# Patient Record
Sex: MALE | Race: WHITE
[De-identification: names, ages, dates, MRNs, and addresses within clinical notes are randomized per-mention and may not be internally consistent; named-entity substitution may affect disease eponyms.]

---

## 2019-07-05 ENCOUNTER — HOSPITAL ENCOUNTER (OUTPATIENT)
Dept: HOSPITAL 84 - D.ER | Age: 54
Setting detail: OBSERVATION
LOS: 1 days | Discharge: HOME | End: 2019-07-06
Attending: INTERNAL MEDICINE | Admitting: INTERNAL MEDICINE
Payer: MEDICARE

## 2019-07-05 VITALS
WEIGHT: 225.47 LBS | HEIGHT: 70 IN | BODY MASS INDEX: 32.28 KG/M2 | WEIGHT: 225.47 LBS | BODY MASS INDEX: 32.28 KG/M2 | HEIGHT: 70 IN

## 2019-07-05 VITALS — SYSTOLIC BLOOD PRESSURE: 129 MMHG | DIASTOLIC BLOOD PRESSURE: 74 MMHG

## 2019-07-05 VITALS — DIASTOLIC BLOOD PRESSURE: 73 MMHG | SYSTOLIC BLOOD PRESSURE: 118 MMHG

## 2019-07-05 VITALS — DIASTOLIC BLOOD PRESSURE: 68 MMHG | SYSTOLIC BLOOD PRESSURE: 128 MMHG

## 2019-07-05 VITALS — DIASTOLIC BLOOD PRESSURE: 73 MMHG | SYSTOLIC BLOOD PRESSURE: 130 MMHG

## 2019-07-05 DIAGNOSIS — I25.110: Primary | ICD-10-CM

## 2019-07-05 DIAGNOSIS — E78.5: ICD-10-CM

## 2019-07-05 DIAGNOSIS — E87.1: ICD-10-CM

## 2019-07-05 DIAGNOSIS — E83.42: ICD-10-CM

## 2019-07-05 DIAGNOSIS — N17.9: ICD-10-CM

## 2019-07-05 DIAGNOSIS — I10: ICD-10-CM

## 2019-07-05 DIAGNOSIS — F17.213: ICD-10-CM

## 2019-07-05 DIAGNOSIS — E11.65: ICD-10-CM

## 2019-07-05 LAB
ALBUMIN SERPL-MCNC: 3.5 G/DL (ref 3.4–5)
ALP SERPL-CCNC: 68 U/L (ref 46–116)
ALT SERPL-CCNC: 39 U/L (ref 10–68)
AMYLASE SERPL-CCNC: 22 U/L (ref 25–115)
ANION GAP SERPL CALC-SCNC: 15.1 MMOL/L (ref 8–16)
APTT BLD: 26.2 SECONDS (ref 22.8–39.4)
BASOPHILS NFR BLD AUTO: 0.2 % (ref 0–2)
BILIRUB SERPL-MCNC: 0.37 MG/DL (ref 0.2–1.3)
BUN SERPL-MCNC: 12 MG/DL (ref 7–18)
CALCIUM SERPL-MCNC: 9.7 MG/DL (ref 8.5–10.1)
CHLORIDE SERPL-SCNC: 99 MMOL/L (ref 98–107)
CK MB SERPL-MCNC: 0.5 U/L (ref 0–3.6)
CK SERPL-CCNC: 85 UL (ref 21–232)
CO2 SERPL-SCNC: 21.6 MMOL/L (ref 21–32)
CREAT SERPL-MCNC: 1.6 MG/DL (ref 0.6–1.3)
EOSINOPHIL NFR BLD: 2.4 % (ref 0–7)
ERYTHROCYTE [DISTWIDTH] IN BLOOD BY AUTOMATED COUNT: 14.1 % (ref 11.5–14.5)
GLOBULIN SER-MCNC: 3.1 G/L
GLUCOSE SERPL-MCNC: 472 MG/DL (ref 74–106)
HCT VFR BLD CALC: 39.5 % (ref 42–54)
HGB BLD-MCNC: 14.2 G/DL (ref 13.5–17.5)
IMM GRANULOCYTES NFR BLD: 0.6 % (ref 0–5)
INR PPP: 0.96 (ref 0.85–1.17)
KETONES SERPL-MCNC: NEGATIVE MG/DL
LIPASE SERPL-CCNC: 115 U/L (ref 73–393)
LYMPHOCYTES NFR BLD AUTO: 28 % (ref 15–50)
MAGNESIUM SERPL-MCNC: 1.6 MG/DL (ref 1.8–2.4)
MCH RBC QN AUTO: 29.4 PG (ref 26–34)
MCHC RBC AUTO-ENTMCNC: 35.9 G/DL (ref 31–37)
MCV RBC: 81.8 FL (ref 80–100)
MONOCYTES NFR BLD: 7.1 % (ref 2–11)
NEUTROPHILS NFR BLD AUTO: 61.7 % (ref 40–80)
OSMOLALITY SERPL CALC.SUM OF ELEC: 285 MOSM/KG (ref 275–300)
PLATELET # BLD: 154 10X3/UL (ref 130–400)
PMV BLD AUTO: 9.8 FL (ref 7.4–10.4)
POTASSIUM SERPL-SCNC: 3.7 MMOL/L (ref 3.5–5.1)
PROT SERPL-MCNC: 6.6 G/DL (ref 6.4–8.2)
PROTHROMBIN TIME: 12.3 SECONDS (ref 11.6–15)
RBC # BLD AUTO: 4.83 10X6/UL (ref 4.2–6.1)
SODIUM SERPL-SCNC: 132 MMOL/L (ref 136–145)
TROPONIN I SERPL-MCNC: < 0.017 NG/ML (ref 0–0.06)
WBC # BLD AUTO: 5.1 10X3/UL (ref 4.8–10.8)

## 2019-07-05 NOTE — HEMODYNAMI
PATIENT:ANGUS ROCHA                                  MEDICAL RECORD: [id number]
: 65                                            [address]
[id number]                                       ADMISSION DATE: 19
 
 
 Generatedon:201910:24
Patient name: ANGUS ROCHA Patient #: [id number] Visit #: [id number] SSN: 
: 1965 Date
of study: 2019
Page: Of
Hemodynamic Procedure Report
****************************
Patient Data
Patient Demographics
Procedure consent was obtained
First Name: ANGUS          Gender: Male
Last Name: AJ           : 1965
Middle Initial: R           Age: 54 year(s)
Patient #: [id number]       Race: Unknown
Visit #: [id number]
Accession #:
[id number]
Additional ID: [id number]
Contact details
Address: [address] [place] [address]   Phone: [phone]
[address]
State: [place]
City: [place]
Zip code: [address]
Past Medical History
Allergies: No known allergies
Admission
Admission Data
Admission Date: 2019    Admission Time: 23:34
Room #: 
Lab Results
Lab Result Date: 2019   Lab Result Time: 0:00
Biochemistry
Name         Units    Result                Min      Max
BUN          mg/dl    13       --(--*-)--   7        18
Creatinine   mg/dl    1.3      --(---*)--   0.6      1.3
eGFR         ml/min   61.08005 *-(----)--   90       120
NONAFRICAN
CBC
Name         Units    Result                Min      Max
Hematocrit   %        39.7     -*(----)--   42       54
Hemoglobin   g/dl     13.9     --(*---)--   13.5     17.5
Procedure
Procedure Types
Cath Procedure
Diagnostic Procedure
LHC
LHC w/Coronaries
Procedure Description
Procedure Date
Procedure Date: 2019
Procedure Start Time: 10:14
Procedure End Time: 10:23
Procedure Staff
 
Name                            Function
Charbel Saravia MD              Performing Physician
Sanju Miller RT                  Monitor
Marlin Rosa RT               Scrub
Ida Flores RN                  Nurse
Procedure Data
Cath Procedure
Fluoroscopy
Diagnostic fluoroscopy      Total fluoroscopy Time: 1.3
time: 1.3 min               min
Diagnostic fluoroscopy      Total fluoroscopy dose: 576
dose: 576 mGy               mGy
Contrast Material
Contrast Material Type                       Amount (ml)
Isovue 370                                   35
Entry Location
Entry     Primary  Successful  Side  Size  Upsize Upsize Entry    Closure     Kim
ccessful  Closure
Location                             (Fr)  1 (Fr) 2 (Fr) Remarks  Device        
          Remarks
Radial                         Right 6 Fr                         Mechanical
artery                               Short                        Compression
Estimated blood loss: 10 ml
Diagnostic catheters
Device Type               Used For           End Catheter
Placement
DIAGNOSTIC Dimock 110cm 5  Procedure
Fr catheter ([id number])
Procedure Complications
No complications
Procedure Medications
Medication           Administration Route Dosage
Oxygen               etCO2 Nasal cannula  2 l/min
Lidocaine 2%         added to field       20
Heparin Flush Bag    added to field       2 bags
(1000units/500ml NS)
0.9% NaCl            I.V.                 100 ml/hr
Radial Cocktail      I.A.                 1 syringe
(Verapamil 2mg/Nitro
400mcg/Heparin
1500units)
Versed               I.V.                 1 mg
Fentanyl             I.V.                 50 mcg
Versed               I.V.                 1 mg
Fentanyl             I.V.                 50 mcg
Hemodynamics
Rest
HGB: 13.9 (g/dl) Heart Rate: 66 (bpm)
Pressure Samples
Time     Site     Value (mmHg) Purpose      Heart      Use
Rate(bpm)
10:17    LV       121/14,15    Snapshot     69
Gradients
Valve  Time  Site Site Mean    SEP/DFP    Peak To Heart  Use
1    2    (mmHg)  (sec/min)  Peak    Rate
(mmHg)  (bpm)
Aortic 10:18 LV   AO                              75
Snapshots
Pre Cath      Intra         NCS           Post Cath
Vital Signs
 
Time     Heart  Resp   SPO2 etCO2   NIBP (mmHg) Rhythm  Pain    Sedation
Rate   (ipm)  (%)  (mmHg)                      Status  Level
(bpm)
10:06:36 65     14     96   27.8    132/95(114) NSR     0 (11)  10(A)
, No
pain
10:10:49 63     17     94   13.5    133/87(107) NSR     0 (11)  10(A)
, No
pain
10:15:59 69     16     94   15      135/85(109) NSR     0 (11)  9(A)
, No
pain
10:20:13 88     13     94   10.5    127/81(107) NSR     0 (11)  9(A)
, No
pain
10:23:31 82     15     93   12.7    124/88(103) NSR     0 (11)  10(A)
, No
pain
Medications
Time     Medication       Route   Dose    Verified Delivered Reason       Notes 
 Effectiveness
by       by
10:05:29 Oxygen           etCO2   2 l/min Charbel Prieto    used for
Nasal           [facility] Flores RN   procedure
cannula         MD
10:07:06 Radial Cocktail  I.A.    1       Charbel Barger   for
(Verapamil               syringe [facility]   vasodilation
2mg/Nitro                        MD MANUEL
400mcg/Heparin
1500units)
10:07:37 Lidocaine 2%     added   20ml    Charbel Barger   for local
to      vial    [facility]   anesthetic
field           MD       MD
10:07:43 Heparin Flush    added   2 bags  Charbel Barger   used for
Bag              to              [facility]   procedure
(1000units/500ml field           MD       MD
NS)
10:07:52 0.9% NaCl        I.V.    100     Charbel Prieto    Per
ml/hr   [facility] Flores RN   physician
MD
10:12:05 Versed           I.V.    1 mg    Charbel Prieto    for sedation
[facility] Flores RN, MD
10:12:13 Fentanyl         I.V.    50 mcg  Charbel Prieto    for sedation
[facility] Flores RN, MD
10:19:27 Versed           I.V.    1 mg    Charbel Prieto    for sedation
[facility] Flores RN, MD
10:19:32 Fentanyl         I.V.    50 mcg  Charbel Prieto    for sedation
[facility] Flores RN, MD
Procedure Log
Time     Note
9:35:14  Signed procedure consent form obtained from patient.
9:35:17  Diagnostic Cath status Urgent
9:35:18  Time tracking: Regular hours (M-F 7:00 - 5:00)
9:35:22  Plan of Care:Hemodynamics will remain stable., Cardiac
rhythm will remain stable., Comfort level will be
maintained., Respiratory function will remain
 
adequate., Patient/ family verbilizes understanding of
procedure., Procedure tolerated without complication.,
Recovers from procedure without complications..
9:35:24  Sanju Miller RT(R) sent for patient. Start room use.
9:39:40  Is patient on blood thinner?Yes
9:39:43  **ACC** The patient was administered the following
blood thiners within the last 24 hours: **ACC**Plavix
9:39:46  Patient diabetic? Yes.
9:39:47  If diabetic: On Metformin? Yes
9:39:49  If on Metformin: Last Dose? 2019
9:40:25  Patient allergic to No known allergies
9:41:34  Lab Result : BUN 13 mg/dl
9:41:34  Lab Result : Creatinine 1.3 mg/dl
9:41:34  Lab Result : eGFR NONAFRICAN 61.64707 ml/min
9:41:34  Lab Result : Hemoglobin 13.9 g/dl
9:41:34  Lab Result : Hematocrit 39.7 %
9:52:24  Patient received from Med II to CCL 1 Alert and
oriented. Tansferred to table in Supine position.
9:52:25  Warm blankets applied, and sandi hugger turned on for
patient comfort.
9:52:26  Correct patient and procedure confirmed by team.
9:52:26  ECG and BP/O2 sat monitors applied to patient.
10:05:29 Oxygen 2 l/min etCO2 Nasal cannula was administered by
Ida Flores RN; used for procedure;
10:05:29 Vital chart was started
10:07:06 Radial Cocktail (Verapamil 2mg/Nitro 400mcg/Heparin
1500units) 1 syringe I.A. was administered by Charbel Saravia MD; for vasodilation;
10:07:37 Lidocaine 2% 20ml vial added to field was administered
by Charbel Saravia MD; for local anesthetic;
10:07:43 Heparin Flush Bag (1000units/500ml NS) 2 bags added to
field was administered by Charbel Saravia MD; used for
procedure;
10:07:52 0.9% NaCl 100 ml/hr I.V. was administered by Ida Flores RN; Per physician;
10:09:27 Baseline sample Acquired.
10:09:29 Rhythm: sinus rhythm
10:09:30 Full Disclosure recording started
10:09:50 H&P Date Dictated: 2019 Within 30 days and on
chart..
10:09:53 Pre-procedure instructions explained to patient.
10:09:53 Pre-op teaching completed and patient verbalized
understanding.
10:09:56 Family unavailable.
10:09:57 Patient NPO since Midnight.
10:09:59 Is the patient allergic to Iodine/contrast media? No.
10:10:05 Previous problem with sedation/anesthesia? No ?
10:10:05 Snore? Yes
10:10:06 Sleep apnea? Yes
10:10:08 Deviated septum? No
10:10:08 Opens mouth fully? Yes
10:10:09 Sticks out tongue? Yes
10:10:11 Airway obstruction? No ?
10:10:14 Dentures? Yes OUT
10:10:18 Pre procedure: right dorsailis pedis pulse 1+
Palpable, but thready & weak; easily obliterated
10:10:19 Modified Luis's test Ulnar < 7 seconds
10:10:22 Patient pain scale 0/10 ?.
10:10:27 IV patent on arrival in left antecubital with 0.9%
NaCl at O.
 
10:10:29 Lab results completed and on chart.
10:10:32 Right Radial & Right Groin area was prepped with
chlora-prep and draped in sterile fashion
10:10:33 Alarms reviewed by R. N.
10:10:33 Sharps counted by scrub and verified by R.N.
10:10:35 --------ALL STOP TIME OUT------
10:10:35 Final Timeout: patient, procedure, and site verified
with staff and physician. All members of the team are
in agreement.
10:10:39 Right Radial & Right Groin site verified by team.
10:10:43 Fire Safety Assessment: A--An alcohol-based skin
anteseptic being used preoperatively., C--Open oxygen
or nitrous oxide is being used., D--An ESU, laser, or
fiber-optic light is being used.
10:10:45 Physical assessment completed. ASA score P 2 - A
patient with mild systemic disease as per Charbel Saravia MD.
10:10:50 2) 60-89 Mildly reduced kidney function, and other
findings (as for stage 1) point to kidney disease.
10:10:54 Maximum allowable contrast does (3.7 X eGFR X 0.75)169
ml.
10:10:59 Sedation plan: IV Moderate Sedation Medication:Versed,
Fentanyl
10:12:05 Versed 1 mg I.V. was administered by Ida Flores RN;
for sedation;
10:12:13 Fentanyl 50 mcg I.V. was administered by Ida Flores
RN; for sedation;
10:13:57 Use device set Radial Dx or PCI
10:13:58 Tegaderm 4 x 4 (1626W) opened to sterile field.
10:13:59 ACIST Hand Control (92368) opened to sterile field.
10:14:00 ACIST Manifold (21802) opened to sterile field.
10:14:00 Bag Decanter () opened to sterile field.
10:14:01 Medline Cath Pack ([id number]) opened to sterile field.
10:14:01 ACIST Syringe (46836) opened to sterile field.
10:14:02 MBrace Wrist Support ([id number]) opened to sterile
field.
10:14:04 SHEATH 6FR Slender () opened to sterile field.
10:14:04 EMERALD Guide Wire (052-582) opened to sterile field.
10:14:09 Procedure started.
10:14:14 Local anesthetic to right radial artery with Lidocaine
2% by Charbel Saravia MD.**INITIAL ACCESS ONLY**
10:14:31 IV Extension Set opened to sterile field.
10:16:45 A 6 Fr Short sheath was inserted into the Right Radial
artery
10:17:15 A DIAGNOSTIC Tiger 110cm 5 Fr catheter ([id number]) was
advanced over the wire and used for Procedure.
10:18:17 LV angiography performed.
10:18:18 LV gram done using RUSHING
10:18:36 EF : 55 %
10:18:39 LV hemodynamics recorded.
10:18:42 Injector settings: Ml/sec: 7, Volume: 15,
10:19:16 LCA angiography performed.
10:19:27 Versed 1 mg I.V. was administered by Ida Flores RN;
for sedation;
10:19:32 Fentanyl 50 mcg I.V. was administered by Ida Flores
RN; for sedation;
10:20:17 RCA angiography performed.
10:20:45 Catheter removed.
10:21:20 Sheath removed intact; hemostasis achieved with
Mechanical Compression to the Right Radial artery.
 
10:21:22 Procedure ended.(Physican Out)
10:21:50 Fluoroscopy time 01.30 minutes.
10:21:54 Fluoroscopy dose: 576 mGy
10:21:54 Flurop Dose total: 576
10:21:59 Contrast amount:Isovue 370 35ml.
10:22:01 Sharps counted by scrub and verified by R.N.
10:22:05 TR band inflated with 10cc of air.
10:22:07 Insertion/operative site no bleeding no hematoma.
10:22:07 Post Procedure Pulses reassessed and unchanged
10:22:11 Post-procedure physical assessment completed. ASA
score P 2 - A patient with mild systemic disease as
per Charbel Saravia MD.
10:22:13 Post procedure rhythm: unchanged.
10:22:16 Estimated blood loss: 10 ml
10:22:18 Post procedure instruction explained to
patient.Patient verbalizes understanding.
10:22:18 Patient needs reinforcement of post procedure
teaching.
10:22:38 TR BAND Large ([id number]) opened to sterile field.
10:22:54 Procedure and supply charges have been captured,
reviewed, submitted and are correct.
10:22:57 Procedure Complication : No complications
10:23:00 Vital chart was stopped
10:23:01 See physician's report for complete and final results.
10:23:03 Report given to [facility].
10:23:11 Patient transfered to [facility] with Bed.
10:23:13 Procedure ended.
10:23:13 Full Disclosure recording stopped
10:23:52 End room use (Document Last)
Device Usage
Item Name   Manufacture  Quantity  Catalog      Hospital Part    Current Minimal
 Lot# /
Number       Charge   Number  Stock   Stock   Serial#
Code
Tegaderm 4  3M           1         1626W        019286   537101  755387  5
x 4 (1626W)
ACIST Hand  Acist        1         32582        535112   467968  943577  5
Control     Medical
(04867)     Systems Inc
ACIST       Acist        1         62833        478633   960003  694665  5
Manifold    Medical
(78495)     Systems Inc
Bag         Microtek     1         S        419959   64161   393498  5
Decanter    Medical Inc.
(S)
Medline     Medline      1         [id number]    606521   60241   957718  5
Cath Pack
([id number])
ACIST       Acist        1         82347        490583   262625  262932  20
Syringe     Medical
(39346)     Systems Inc
MBrace      Advanced     1         140-0250-00  490427   56537   518863  5
Wrist       Vascular
Support     Dynamics
([id number])
SHEATH 6FR  Terumo       1         [id number]   305044   614763  162675  5
Slender
()
EMERALD     Cardinal     1         502-455      309135   597388  518119  5
Guide Wire  Health
 
(502455)
IV          Hospira      1         54857-15     276952   85109   902161  5
Extension
Set
DIAGNOSTIC  Terumo       1               940954   073514  133379  5
Tiger 110cm
5 Fr
catheter
(487512)
TR BAND     Terumo       1         [id number]    706181   922290  825915  40
Large
([id number])
Signature Audit [facility]
Stage           Time        Signature      Unsigned
Intra-Procedure 2019    Sanju Miller
10:24:30 AM RT(R)
Signatures
Monitor : Sanju Miller RT Signature :
______________________________
Date : ______________ Time :
______________
 
 
 
 
 
 
 
 
 
 
 
 
 
 
 
 
 
 
 
 
 
 
 
 
 
 
 
 
 
 
 
 
 
 
[address] [address]

## 2019-07-05 NOTE — EC
PATIENT:ANGUS ROCHA                 DATE OF SERVICE: 07/05/19
SEX: M                                  MEDICAL RECORD: [id number]
DATE OF BIRTH: [birth year]                        LOCATION:D.M2      D.210
AGE OF PATIENT: 54                             ADMISSION DATE: 07/05/19
 
REFERRING PHYSICIAN:                               
 
INTERPRETING PHYSICIAN: ALONDRA HAMILTON MD          
 
 
 
                             ECHOCARDIOGRAM REPORT
  ECHO CHARGES 4               ECHO COMPLETE                 Date: 07/06/19
 
 
 
CLINICAL DIAGNOSIS: CP                            
 
                         ECHOCARDIOGRAPHIC MEASUREMENTS
      (adult normal given)
   AC root (d.<3.7cm) 3.3  cm   LV Septum d (<1.2 cm> 1.0  cm
      Valve Excursion 2.1  cm     LV Septum (systole) 1.6  cm
Left Atria (s.<4.0cm> 3.3  cm          LVPW d(<1.2cm) 1.4  cm
        RV (d.<2.3cm) 2.2  cm           LVPW (sytole) 1.5  cm
  LV diastole(<5.6CM) 5.5  cm       MV E-F(>70mm/sec)      cm
           LV systole 4.2  cm           LVOT Diameter 1.9  cm
       MV exc.(>10mm)      cm
Est.ejection fraction (50-75%)     %
 
   DOPPLER:
     LVIT      cm/sec A 67   cm/sec E 60    cm/sec
       LA      cm/sec      RVSP 26.8 mmHg
     LVOT 100  cm/sec   AOP1/2T      m/s
  Asc. Ao 130  cm/sec
     RVOT 53   cm/sec
       RA      cm/sec
       PA 72   cm/sec
 AV Gradient Peak 6.8  mmHg  AV Mean 3.5  mmHg  AV Area 2.1  cm
 MV Gradient Peak 3.1  mmHg  MV Mean 1.7  mmHg  MV Area      cm
   COMMENTS:                                              
 
 
 Cardiac Sonographer: Shailesh               KATARZYNA DOWNS             
      Cardiologist: 3          Dr. Anglin             
             TAPE# PACS           
                                       Pericardial Effusion N                        
 
 
DATE OF SERVICE:  
 
Adequate 2-D echo, color-flow and spectral Doppler, and M-mode.
 
No LVH.  LV internal dimensions are normal.  Wall motion is normal.  EF is
greater than 55%.  Aortic valve is tricuspid.  No evidence of stenosis by
Doppler interrogation.  Left atrium is normal at 3.3 cm.  Mitral valve shows no
prolapse.  Trace MR.  Right-sided chambers are grossly normal.  Mild TR.
 
TRANSINT:[id number] Voice Confirmation ID: [id number] DOCUMENT ID: [id number]
 
 
 
ECHOCARDIOGRAM REPORT                          [id number]    AJ,ANGUS ALONDRA SMITH MD          
 
 
 
 
 
 
 
 
 
 
 
 
 
 
 
 
 
 
 
 
 
 
 
 
 
 
 
 
 
 
 
 
 
 
 
 
 
 
 
 
 
 
 
 
CC:                                                             [id number]
DICTATION DATE: 07/07/19 1007     :     07/07/19 1315      DIS IN  
                                                                      07/06/19
[place] [id number] [place] [id number]

## 2019-07-06 VITALS — SYSTOLIC BLOOD PRESSURE: 124 MMHG | DIASTOLIC BLOOD PRESSURE: 77 MMHG

## 2019-07-06 VITALS — DIASTOLIC BLOOD PRESSURE: 68 MMHG | SYSTOLIC BLOOD PRESSURE: 126 MMHG

## 2019-07-06 VITALS — SYSTOLIC BLOOD PRESSURE: 113 MMHG | DIASTOLIC BLOOD PRESSURE: 68 MMHG

## 2019-07-06 LAB
ALBUMIN SERPL-MCNC: 3.3 G/DL (ref 3.4–5)
ALP SERPL-CCNC: 60 U/L (ref 46–116)
ALT SERPL-CCNC: 37 U/L (ref 10–68)
ANION GAP SERPL CALC-SCNC: 10.9 MMOL/L (ref 8–16)
BASOPHILS NFR BLD AUTO: 0.4 % (ref 0–2)
BILIRUB SERPL-MCNC: 0.37 MG/DL (ref 0.2–1.3)
BUN SERPL-MCNC: 13 MG/DL (ref 7–18)
CALCIUM SERPL-MCNC: 9.6 MG/DL (ref 8.5–10.1)
CHLORIDE SERPL-SCNC: 105 MMOL/L (ref 98–107)
CK MB SERPL-MCNC: 1 U/L (ref 0–3.6)
CK SERPL-CCNC: 80 UL (ref 21–232)
CO2 SERPL-SCNC: 29.4 MMOL/L (ref 21–32)
CREAT SERPL-MCNC: 1.3 MG/DL (ref 0.6–1.3)
EOSINOPHIL NFR BLD: 3.9 % (ref 0–7)
ERYTHROCYTE [DISTWIDTH] IN BLOOD BY AUTOMATED COUNT: 14.2 % (ref 11.5–14.5)
GLOBULIN SER-MCNC: 3.1 G/L
GLUCOSE SERPL-MCNC: 259 MG/DL (ref 74–106)
HCT VFR BLD CALC: 39.7 % (ref 42–54)
HGB BLD-MCNC: 13.9 G/DL (ref 13.5–17.5)
IMM GRANULOCYTES NFR BLD: 0.6 % (ref 0–5)
LYMPHOCYTES NFR BLD AUTO: 30.5 % (ref 15–50)
MAGNESIUM SERPL-MCNC: 1.6 MG/DL (ref 1.8–2.4)
MCH RBC QN AUTO: 28.9 PG (ref 26–34)
MCHC RBC AUTO-ENTMCNC: 35 G/DL (ref 31–37)
MCV RBC: 82.5 FL (ref 80–100)
MONOCYTES NFR BLD: 8.4 % (ref 2–11)
NEUTROPHILS NFR BLD AUTO: 56.2 % (ref 40–80)
OSMOLALITY SERPL CALC.SUM OF ELEC: 289 MOSM/KG (ref 275–300)
PLATELET # BLD: 133 10X3/UL (ref 130–400)
PMV BLD AUTO: 9.8 FL (ref 7.4–10.4)
POTASSIUM SERPL-SCNC: 4.3 MMOL/L (ref 3.5–5.1)
PROT SERPL-MCNC: 6.4 G/DL (ref 6.4–8.2)
RBC # BLD AUTO: 4.81 10X6/UL (ref 4.2–6.1)
SODIUM SERPL-SCNC: 141 MMOL/L (ref 136–145)
TROPONIN I SERPL-MCNC: < 0.017 NG/ML (ref 0–0.06)
WBC # BLD AUTO: 5.4 10X3/UL (ref 4.8–10.8)

## 2019-07-07 NOTE — CN
PATIENT NAME:ANGUS ROCHA                               MEDICAL RECORD: [id number]
: 65                                              LOCATION:. D.2105
ADMIT DATE: 19                                       ACCOUNT: [id number]
CONSULTING PHYSICIAN:    ALONDRA HAMILTON MD          
                                               
REFERRING PHYSICIAN:     YAMILETH SALINAS MD               
 
 
DATE OF CONSULTATION:  2019
 
HISTORY OF PRESENT ILLNESS:  A 54-year-old gentleman with known history of
coronary artery disease status post intervention approximately 1 year ago.  He
has a history of diabetes mellitus, dyslipidemia and hypertension, admitted with
rapidly progressive angina, onset of rest symptomology class IV over the past 2
weeks.  He does have a history of smoking.  No set exercise or diet program.  We
are asked to see him concerning his cardiovascular status.
 
PAST MEDICAL HISTORY:  Includes;
1.  History of coronary artery disease as described above.
2.  Diabetes mellitus.
3.  Hypertension.
4.  Hyperlipidemia.
 
ALLERGIES:  None known.
 
SOCIAL HISTORY:  Smokes about a pack a day.  Easily takes care of his ADLs.  No
set exercise program.
 
MEDICATIONS:  Typically include Plavix 75 every day, fenofibrate 145 every day,
pravastatin 20 every day, Lyrica 75 b.i.d., Glucotrol 10 every day, and
metformin 1 gram b.i.d.
 
REVIEW OF SYSTEMS:  The patient reports easy bruising but reports no swollen
glands. The patient reports no fever, no night sweats, no significant weight
gain, no significant weight loss.  No significant exercise tolerance.  The
patient reports no dry eyes, no irritation, no vision change.  Patient reports
no difficulty hearing and no ear pain.  Patient reports no frequent nose bleeds
or nose and sinus problems.  Patient reports on arm pain on exertion.  No
shortness of breath while lying down.  No history of heart murmur.  Patient
reports no cough, no wheezing or coughing up blood.  Patient reports no
abdominal pain, no vomiting.  Normal appetite.  No diarrhea and not vomiting
blood.  No nausea and no constipation.  Patient reports no incontinence.  No
difficulty urinating.  No hematuria.  No increased frequency.  Patient reports
no muscle aches.  No weakness, no arthralgias, no back pain.  No swelling of the
extremities.  Patient reports no abnormal mole, no jaundice, no rashes.  Reports
no loss of consciousness.  No weakness and no numbness.  No seizures, dizziness,
or headaches.  The patient reports no depression, no sleep disturbance, feeling
safe in a relationship and no alcohol abuse.  Patient reports on fatigue. 
Reports no runny nose or sinus pressure.  No itching, no hives, and no frequent
sneezing.
 
PHYSICAL EXAMINATION:
GENERAL:  Middle-aged, unkempt, in no acute distress.
VITAL SIGNS:  Blood pressure 113/63, pulse 79 and regular.
HEENT:  Normocephalic and atraumatic.
NECK:  No bruits noted.
HEART:  Regular, II/VI systolic ejection murmur.
LUNGS:  Good air excursion.
 
 
 
CONSULT REPORT                                 [id number]    ANGUS ROCHA             
 
 
 
PLAN:  For angiography, intervention based on above.
 
TRANSINT:[id number] Voice Confirmation ID: [id number] DOCUMENT ID: [id number]
                                           
                                           ALONDRA HAMILTON MD          
 
 
 
Electronically Signed by ALONDRA HAMILTON on 19 at 1048
 
 
 
 
 
 
 
 
 
 
 
 
 
 
 
 
 
 
 
 
 
 
 
 
 
 
 
 
 
 
 
 
 
 
 
 
 
CC:                                                             [id number]
DICTATION DATE: 19 1010     :     19 1257      DIS IN  
                                                                      19
[place] [address] [place] [address]

## 2019-07-07 NOTE — OP
PATIENT NAME:  ANGUS ROCHA                           MEDICAL RECORD: [id number]
:[birth year]                                             LOCATION:D.M2     D.2105
                                                         ADMISSION DATE:19
SURGEON:  ALONDRA HAMILTON MD          
 
 
DATE OF OPERATION:  2019
 
PROCEDURE:  Left heart catheterization, selective coronary angiography, right
radial approach.
 
CATHETERS:  Radial sheath, Tiger catheter.
 
The procedure was well tolerated.  The patient returned to the taylor.  Sheath was
removed.  TR band was placed.
 
FINDINGS:  Left ventriculography in 30-degree RUSHING view, normal wall motion and
normal systolic function.
 
CORONARY ANATOMY:
LEFT MAIN:  Left main is free of disease.
LAD:  Free of disease, previously placed stent has no evidence of restenosis. 
No progression of native disease.
CIRCUMFLEX:  This is a codominant system.  Circumflex is free of disease.
 
RIGHT CORONARY ARTERY:  Codominant free of disease.
 
IMPRESSION:  Patent stent, no progression of native disease, normal left
ventricular function.
 
TRANSINT:[id number] Voice Confirmation ID: [id number] DOCUMENT ID: [id number]
                                           
                                           ALONDRA HAMILTON MD          
 
 
 
Electronically Signed by ALONDRA HAMILTON on 19 at 1048
 
 
 
 
 
 
 
 
 
 
 
 
 
CC:                                                             [id number]
DICTATION DATE: 19 1026     :     19 1310      DIS IN  
                                                                      19
[place] [id number] [place] [phone]

## 2019-07-08 NOTE — MORECARE
CASE MANAGEMENT DISCHARGE SUMMARY
 
 
PATIENT: ANGUS ROCHA                     UNIT: [id number]
ACCOUNT#: [id number]                       ADM DATE: 19
AGE: 54     : [birth year]  SEX: M            ROOM/BED: D.2105    
AUTHOR: PARDEEP HOWE                             PHYSICIAN:                               
 
REFERRING PHYSICIAN: YAMILETH SALINAS MD               
DATE OF SERVICE: 19
Discharge Plan
 
 
Patient Name: ANGUS ROCHA
[facility]
Encounter #: [id number]
Medical Record #: [id number]
: 1965
Planned Disposition: Home
Anticipated Discharge Date: 19
 
Discharge Date: 2019
Expected LOS: 1
Initial Reviewer: [id number]
Initial Review Date: 2019
Generated: 19   9:14 am 
  
 
 
 
 
 
 
 
Patient Name: ANGUS ROCHA
 
Encounter #: [id number]
Page 05050
 
 
 
 
 
Electronically Signed by PARDEEP HOWE on 19 at 0814
 
 
 
 
 
 
**All edits/amendments must be made on the electronic document**
 
DICTATION DATE: 19     : DARIEN  19     
RPT#: [id number]                                DC DATE:19
                                               STATUS: DIS IN  
[facility]
[address] [place] [address]
***END OF REPORT***

## 2020-03-14 ENCOUNTER — HOSPITAL ENCOUNTER (EMERGENCY)
Dept: HOSPITAL 84 - D.ER | Age: 55
LOS: 1 days | Discharge: HOME | End: 2020-03-15
Payer: MEDICARE

## 2020-03-14 VITALS
WEIGHT: 299.63 LBS | BODY MASS INDEX: 42.9 KG/M2 | WEIGHT: 299.63 LBS | HEIGHT: 70 IN | HEIGHT: 70 IN | BODY MASS INDEX: 42.9 KG/M2

## 2020-03-14 DIAGNOSIS — I10: ICD-10-CM

## 2020-03-14 DIAGNOSIS — E13.65: Primary | ICD-10-CM

## 2020-03-14 DIAGNOSIS — K21.9: ICD-10-CM

## 2020-03-14 DIAGNOSIS — G61.0: ICD-10-CM

## 2020-03-14 DIAGNOSIS — E78.5: ICD-10-CM

## 2020-03-14 DIAGNOSIS — R07.89: ICD-10-CM

## 2020-03-14 DIAGNOSIS — Z79.84: ICD-10-CM

## 2020-03-14 LAB
ALBUMIN SERPL-MCNC: 3.2 G/DL (ref 3.4–5)
ALP SERPL-CCNC: 46 U/L (ref 30–120)
ALT SERPL-CCNC: 27 U/L (ref 10–68)
ANION GAP SERPL CALC-SCNC: 11.6 MMOL/L (ref 8–16)
APTT BLD: 27.5 SECONDS (ref 22.8–39.4)
BASOPHILS NFR BLD AUTO: 0.4 % (ref 0–2)
BILIRUB SERPL-MCNC: 0.25 MG/DL (ref 0.2–1.3)
BUN SERPL-MCNC: 15 MG/DL (ref 7–18)
CALCIUM SERPL-MCNC: 9.9 MG/DL (ref 8.5–10.1)
CHLORIDE SERPL-SCNC: 101 MMOL/L (ref 98–107)
CK MB SERPL-MCNC: 0.8 U/L (ref 0–3.6)
CK SERPL-CCNC: 122 UL (ref 21–232)
CO2 SERPL-SCNC: 26.6 MMOL/L (ref 21–32)
CREAT SERPL-MCNC: 1.1 MG/DL (ref 0.6–1.3)
EOSINOPHIL NFR BLD: 3.2 % (ref 0–7)
ERYTHROCYTE [DISTWIDTH] IN BLOOD BY AUTOMATED COUNT: 14.6 % (ref 11.5–14.5)
GLOBULIN SER-MCNC: 3.4 G/L
GLUCOSE SERPL-MCNC: 399 MG/DL (ref 74–106)
HCT VFR BLD CALC: 39.6 % (ref 42–54)
HGB BLD-MCNC: 13.5 G/DL (ref 13.5–17.5)
IMM GRANULOCYTES NFR BLD: 7 % (ref 0–5)
INR PPP: 0.95 (ref 0.85–1.17)
LYMPHOCYTES NFR BLD AUTO: 25.3 % (ref 15–50)
MAGNESIUM SERPL-MCNC: 1.9 MG/DL (ref 1.8–2.4)
MCH RBC QN AUTO: 28.1 PG (ref 26–34)
MCHC RBC AUTO-ENTMCNC: 34.1 G/DL (ref 31–37)
MCV RBC: 82.3 FL (ref 80–100)
MONOCYTES NFR BLD: 8 % (ref 2–11)
NEUTROPHILS NFR BLD AUTO: 56.1 % (ref 40–80)
OSMOLALITY SERPL CALC.SUM OF ELEC: 287 MOSM/KG (ref 275–300)
PLATELET # BLD: 187 10X3/UL (ref 130–400)
PMV BLD AUTO: 9.5 FL (ref 7.4–10.4)
POTASSIUM SERPL-SCNC: 4.2 MMOL/L (ref 3.5–5.1)
PROT SERPL-MCNC: 6.6 G/DL (ref 6.4–8.2)
PROTHROMBIN TIME: 12.7 SECONDS (ref 11.6–15)
RBC # BLD AUTO: 4.81 10X6/UL (ref 4.2–6.1)
SODIUM SERPL-SCNC: 135 MMOL/L (ref 136–145)
TROPONIN I SERPL-MCNC: < 0.017 NG/ML (ref 0–0.06)
WBC # BLD AUTO: 7.2 10X3/UL (ref 4.8–10.8)

## 2020-03-15 VITALS — DIASTOLIC BLOOD PRESSURE: 78 MMHG | SYSTOLIC BLOOD PRESSURE: 122 MMHG

## 2021-03-12 ENCOUNTER — HOSPITAL ENCOUNTER (OUTPATIENT)
Dept: HOSPITAL 84 - D.LAB | Age: 56
Discharge: HOME | End: 2021-03-12
Attending: FAMILY MEDICINE
Payer: MEDICARE

## 2021-03-12 VITALS — BODY MASS INDEX: 42.9 KG/M2

## 2021-03-12 DIAGNOSIS — E11.9: ICD-10-CM

## 2021-03-12 DIAGNOSIS — Z00.00: Primary | ICD-10-CM

## 2021-03-12 DIAGNOSIS — E78.5: ICD-10-CM

## 2021-03-12 DIAGNOSIS — I25.10: ICD-10-CM

## 2021-03-12 LAB
ALBUMIN SERPL-MCNC: 3.8 G/DL (ref 3.4–5)
ALP SERPL-CCNC: 50 U/L (ref 30–120)
ALT SERPL-CCNC: 28 U/L (ref 10–68)
ANION GAP SERPL CALC-SCNC: 16.6 MMOL/L (ref 8–16)
BASOPHILS NFR BLD AUTO: 0.4 % (ref 0–2)
BILIRUB SERPL-MCNC: 0.42 MG/DL (ref 0.2–1.3)
BUN SERPL-MCNC: 10 MG/DL (ref 7–18)
CALCIUM SERPL-MCNC: 9.7 MG/DL (ref 8.5–10.1)
CHLORIDE SERPL-SCNC: 100 MMOL/L (ref 98–107)
CHOLEST/HDLC SERPL: 4.8 RATIO (ref 2.3–4.9)
CO2 SERPL-SCNC: 22.8 MMOL/L (ref 21–32)
CREAT SERPL-MCNC: 1.2 MG/DL (ref 0.6–1.3)
EOSINOPHIL NFR BLD: 4.5 % (ref 0–7)
ERYTHROCYTE [DISTWIDTH] IN BLOOD BY AUTOMATED COUNT: 15.1 % (ref 11.5–14.5)
EST. AVERAGE GLUCOSE BLD GHB EST-MCNC: 266 MG/DL (ref 74–154)
GLOBULIN SER-MCNC: 3.4 G/L
GLUCOSE SERPL-MCNC: 388 MG/DL (ref 74–106)
HCT VFR BLD CALC: 45.9 % (ref 42–54)
HDLC SERPL-MCNC: 32 MG/DL (ref 32–96)
HGB BLD-MCNC: 15.6 G/DL (ref 13.5–17.5)
IMM GRANULOCYTES NFR BLD: 0.6 % (ref 0–5)
LDLC SERPL-MCNC: (no result) MG/DL (ref 0–100)
LYMPHOCYTES # BLD: 0.97 10X3/UL (ref 1.32–3.57)
LYMPHOCYTES NFR BLD AUTO: 14.4 % (ref 15–50)
MCH RBC QN AUTO: 27.8 PG (ref 26–34)
MCHC RBC AUTO-ENTMCNC: 34 G/DL (ref 31–37)
MCV RBC: 81.7 FL (ref 80–100)
MONOCYTES NFR BLD: 7.9 % (ref 2–11)
NEUTROPHILS # BLD: 4.87 10X3/UL (ref 1.78–5.38)
NEUTROPHILS NFR BLD AUTO: 72.2 % (ref 40–80)
OSMOLALITY SERPL CALC.SUM OF ELEC: 284 MOSM/KG (ref 275–300)
PLATELET # BLD: 142 10X3/UL (ref 130–400)
PMV BLD AUTO: 9.5 FL (ref 7.4–10.4)
POTASSIUM SERPL-SCNC: 4.4 MMOL/L (ref 3.5–5.1)
PROT SERPL-MCNC: 7.2 G/DL (ref 6.4–8.2)
PSA SERPL-MCNC: 0.87 NG/ML (ref 0–4)
RBC # BLD AUTO: 5.62 10X6/UL (ref 4.2–6.1)
SODIUM SERPL-SCNC: 135 MMOL/L (ref 136–145)
TRIGL SERPL-MCNC: 464 MG/DL (ref 30–200)
TSH SERPL-ACNC: 0.97 UIU/ML (ref 0.36–3.74)
WBC # BLD AUTO: 6.7 10X3/UL (ref 4.8–10.8)

## 2021-04-29 ENCOUNTER — HOSPITAL ENCOUNTER (EMERGENCY)
Dept: HOSPITAL 84 - D.ER | Age: 56
Discharge: HOME | End: 2021-04-29
Payer: MEDICARE

## 2021-04-29 VITALS — HEIGHT: 70 IN | WEIGHT: 300.63 LBS | BODY MASS INDEX: 43.04 KG/M2

## 2021-04-29 VITALS — SYSTOLIC BLOOD PRESSURE: 120 MMHG | DIASTOLIC BLOOD PRESSURE: 70 MMHG

## 2021-04-29 DIAGNOSIS — J44.0: ICD-10-CM

## 2021-04-29 DIAGNOSIS — E11.65: ICD-10-CM

## 2021-04-29 DIAGNOSIS — J20.9: Primary | ICD-10-CM

## 2021-04-29 DIAGNOSIS — J32.9: ICD-10-CM

## 2021-04-29 DIAGNOSIS — Z72.0: ICD-10-CM

## 2021-04-29 DIAGNOSIS — Z79.84: ICD-10-CM

## 2021-04-29 LAB
ALBUMIN SERPL-MCNC: 3.4 G/DL (ref 3.4–5)
ALP SERPL-CCNC: 53 U/L (ref 30–120)
ALT SERPL-CCNC: 27 U/L (ref 10–68)
ANION GAP SERPL CALC-SCNC: 13.6 MMOL/L (ref 8–16)
BASOPHILS NFR BLD AUTO: 0.4 % (ref 0–2)
BILIRUB SERPL-MCNC: 0.44 MG/DL (ref 0.2–1.3)
BUN SERPL-MCNC: 12 MG/DL (ref 7–18)
CALCIUM SERPL-MCNC: 9.2 MG/DL (ref 8.5–10.1)
CHLORIDE SERPL-SCNC: 101 MMOL/L (ref 98–107)
CK MB SERPL-MCNC: 0.9 U/L (ref 0–3.6)
CK SERPL-CCNC: 117 UL (ref 21–232)
CO2 SERPL-SCNC: 24.4 MMOL/L (ref 21–32)
CREAT SERPL-MCNC: 1.1 MG/DL (ref 0.6–1.3)
EOSINOPHIL NFR BLD: 3.3 % (ref 0–7)
ERYTHROCYTE [DISTWIDTH] IN BLOOD BY AUTOMATED COUNT: 15 % (ref 11.5–14.5)
GLOBULIN SER-MCNC: 3.5 G/L
GLUCOSE SERPL-MCNC: 333 MG/DL (ref 74–106)
HCT VFR BLD CALC: 43.3 % (ref 42–54)
HGB BLD-MCNC: 14.7 G/DL (ref 13.5–17.5)
IMM GRANULOCYTES NFR BLD: 0.5 % (ref 0–5)
LYMPHOCYTES # BLD: 0.97 10X3/UL (ref 1.32–3.57)
LYMPHOCYTES NFR BLD AUTO: 17 % (ref 15–50)
MCH RBC QN AUTO: 27.8 PG (ref 26–34)
MCHC RBC AUTO-ENTMCNC: 33.9 G/DL (ref 31–37)
MCV RBC: 82 FL (ref 80–100)
MONOCYTES NFR BLD: 10.2 % (ref 2–11)
NEUTROPHILS # BLD: 3.9 10X3/UL (ref 1.78–5.38)
NEUTROPHILS NFR BLD AUTO: 68.6 % (ref 40–80)
NT-PROBNP SERPL-MCNC: 20 PG/ML (ref 0–125)
OSMOLALITY SERPL CALC.SUM OF ELEC: 282 MOSM/KG (ref 275–300)
PLATELET # BLD: 128 10X3/UL (ref 130–400)
PMV BLD AUTO: 9.9 FL (ref 7.4–10.4)
POTASSIUM SERPL-SCNC: 4 MMOL/L (ref 3.5–5.1)
PROT SERPL-MCNC: 6.9 G/DL (ref 6.4–8.2)
RBC # BLD AUTO: 5.28 10X6/UL (ref 4.2–6.1)
SODIUM SERPL-SCNC: 135 MMOL/L (ref 136–145)
TROPONIN I SERPL-MCNC: < 0.017 NG/ML (ref 0–0.06)
WBC # BLD AUTO: 5.7 10X3/UL (ref 4.8–10.8)

## 2021-05-13 ENCOUNTER — HOSPITAL ENCOUNTER (OUTPATIENT)
Dept: HOSPITAL 84 - D.ER | Age: 56
Setting detail: OBSERVATION
LOS: 2 days | Discharge: NURSING, REHAB OR LONG-TERM CARE | End: 2021-05-15
Attending: FAMILY MEDICINE | Admitting: FAMILY MEDICINE
Payer: MEDICARE

## 2021-05-13 VITALS — DIASTOLIC BLOOD PRESSURE: 78 MMHG | SYSTOLIC BLOOD PRESSURE: 136 MMHG

## 2021-05-13 VITALS — BODY MASS INDEX: 42.8 KG/M2 | WEIGHT: 299 LBS | HEIGHT: 70 IN | BODY MASS INDEX: 42.8 KG/M2

## 2021-05-13 VITALS — DIASTOLIC BLOOD PRESSURE: 86 MMHG | SYSTOLIC BLOOD PRESSURE: 149 MMHG

## 2021-05-13 VITALS — DIASTOLIC BLOOD PRESSURE: 77 MMHG | SYSTOLIC BLOOD PRESSURE: 130 MMHG

## 2021-05-13 DIAGNOSIS — R07.9: Primary | ICD-10-CM

## 2021-05-13 DIAGNOSIS — Z79.84: ICD-10-CM

## 2021-05-13 DIAGNOSIS — F17.200: ICD-10-CM

## 2021-05-13 DIAGNOSIS — E66.01: ICD-10-CM

## 2021-05-13 DIAGNOSIS — I10: ICD-10-CM

## 2021-05-13 DIAGNOSIS — E78.1: ICD-10-CM

## 2021-05-13 DIAGNOSIS — K21.9: ICD-10-CM

## 2021-05-13 DIAGNOSIS — E11.65: ICD-10-CM

## 2021-05-13 DIAGNOSIS — R06.02: ICD-10-CM

## 2021-05-13 DIAGNOSIS — F32.9: ICD-10-CM

## 2021-05-13 DIAGNOSIS — E78.5: ICD-10-CM

## 2021-05-13 DIAGNOSIS — Z79.01: ICD-10-CM

## 2021-05-13 DIAGNOSIS — K59.01: ICD-10-CM

## 2021-05-13 DIAGNOSIS — J30.1: ICD-10-CM

## 2021-05-13 LAB
ALBUMIN SERPL-MCNC: 3.5 G/DL (ref 3.4–5)
ALP SERPL-CCNC: 52 U/L (ref 30–120)
ALT SERPL-CCNC: 27 U/L (ref 10–68)
ANION GAP SERPL CALC-SCNC: 15.4 MMOL/L (ref 8–16)
APTT BLD: 26.3 SECONDS (ref 22.8–39.4)
BASOPHILS NFR BLD AUTO: 0.3 % (ref 0–2)
BILIRUB SERPL-MCNC: 0.3 MG/DL (ref 0.2–1.3)
BUN SERPL-MCNC: 12 MG/DL (ref 7–18)
CALCIUM SERPL-MCNC: 9.4 MG/DL (ref 8.5–10.1)
CHLORIDE SERPL-SCNC: 101 MMOL/L (ref 98–107)
CHOLEST/HDLC SERPL: 4.4 RATIO (ref 2.3–4.9)
CK MB SERPL-MCNC: 1.4 U/L (ref 0–3.6)
CK SERPL-CCNC: 146 UL (ref 21–232)
CO2 SERPL-SCNC: 23.8 MMOL/L (ref 21–32)
CREAT SERPL-MCNC: 1.2 MG/DL (ref 0.6–1.3)
EOSINOPHIL NFR BLD: 3.5 % (ref 0–7)
ERYTHROCYTE [DISTWIDTH] IN BLOOD BY AUTOMATED COUNT: 14.7 % (ref 11.5–14.5)
EST. AVERAGE GLUCOSE BLD GHB EST-MCNC: 255 MG/DL (ref 74–154)
GLOBULIN SER-MCNC: 3.2 G/L
GLUCOSE SERPL-MCNC: 400 MG/DL (ref 74–106)
HCT VFR BLD CALC: 43.9 % (ref 42–54)
HDLC SERPL-MCNC: 29 MG/DL (ref 32–96)
HGB BLD-MCNC: 14.5 G/DL (ref 13.5–17.5)
IMM GRANULOCYTES NFR BLD: 0.9 % (ref 0–5)
INR PPP: 1 (ref 0.85–1.17)
LDLC SERPL-MCNC: (no result) MG/DL (ref 0–100)
LYMPHOCYTES # BLD: 1.23 10X3/UL (ref 1.32–3.57)
LYMPHOCYTES NFR BLD AUTO: 19.5 % (ref 15–50)
MCH RBC QN AUTO: 27.3 PG (ref 26–34)
MCHC RBC AUTO-ENTMCNC: 33 G/DL (ref 31–37)
MCV RBC: 82.7 FL (ref 80–100)
MONOCYTES NFR BLD: 6.2 % (ref 2–11)
NEUTROPHILS # BLD: 4.4 10X3/UL (ref 1.78–5.38)
NEUTROPHILS NFR BLD AUTO: 69.6 % (ref 40–80)
NT-PROBNP SERPL-MCNC: 15 PG/ML (ref 0–125)
OSMOLALITY SERPL CALC.SUM OF ELEC: 288 MOSM/KG (ref 275–300)
PLATELET # BLD: 150 10X3/UL (ref 130–400)
PMV BLD AUTO: 9.8 FL (ref 7.4–10.4)
POTASSIUM SERPL-SCNC: 4.2 MMOL/L (ref 3.5–5.1)
PROT SERPL-MCNC: 6.7 G/DL (ref 6.4–8.2)
PROTHROMBIN TIME: 12.2 SECONDS (ref 11.6–15)
RBC # BLD AUTO: 5.31 10X6/UL (ref 4.2–6.1)
SODIUM SERPL-SCNC: 136 MMOL/L (ref 136–145)
TRIGL SERPL-MCNC: 412 MG/DL (ref 30–200)
TROPONIN I SERPL-MCNC: < 0.017 NG/ML (ref 0–0.06)
WBC # BLD AUTO: 6.3 10X3/UL (ref 4.8–10.8)

## 2021-05-13 NOTE — NUR
RECIEVED UP IN BED WITH EYES CLOSED. EASILY AROUSDES WITH VERBAL STIMULI.
DENIES ANY NEEDS AT THIS TIME.

## 2021-05-13 NOTE — OP
PATIENT NAME:  ANGUS ROCHA                           MEDICAL RECORD: [id number]
:65                                             LOCATION:D.M2     D.2119
                                                         ADMISSION DATE:21
SURGEON:  ALONDRA HAMILTON MD          
 
 
DATE OF OPERATION:  2021
 
PROCEDURE PERFORMED:  Left heart catheterization, selective coronary
angiography, right femoral artery approach.
 
CATHETERS:  A 5-Lithuanian sheath, 5/4 left and right Jazmin, 5/4 pig.
 
The procedure was well tolerated.  The patient returned to taylor.  Sheath
removed.  ExoSeal device placed.
 
FINDINGS:  Left ventriculography in 30-degree RUSHING view:  Normal wall motion,
normal systolic function.
 
CORONARY ANATOMY:
LEFT MAIN:  Left main free of disease.
LAD:  Free of disease in the diagonal system.
CIRCUMFLEX:  Free of disease in the marginal system.
RIGHT CORONARY ARTERY:  Dominant artery, gives rise to PDA, free of disease.
 
IMPRESSION:  Normal left ventricular systolic function, normal coronary anatomy.
 
TRANSINT:[id number] Voice Confirmation ID: [id number] DOCUMENT ID: [id number]
                                           
                                           ALONDRA HAMILTON MD          
 
 
 
 
 
 
 
 
 
 
 
 
 
 
 
 
 
 
 
 
CC:                                                             [id number]
DICTATION DATE: 21 120     :     21      ADM IN  
                                                                              
[facility]                                          
[address] [place] [address]

## 2021-05-13 NOTE — HEMODYNAMI
PATIENT:ANGUS ROCHA                                  MEDICAL RECORD: [id number]
: 65                                            LOCATION:[facility]     [address] [id number]                                       ADMISSION DATE: 21
 
 
 Generatedon:112:03
Patient name: ANGUS ORCHA Patient #: [id number] Visit #: [id number] SSN: 457
- :
1965 Date of study: 2021
Page: Of
Hemodynamic Procedure Report
****************************
Patient Data
Patient Demographics
Procedure consent was obtained
First Name: ANGUS          Gender: Male
Last Name: AJ           : 1965
Middle Initial: R           Age: 56 year(s)
Patient #: [id number]       Race: 
Visit #: [id number]
SSN: 
Accession #:
[id number]
Additional ID: [id number]
Contact details
Address: [address] [place] [address]    Phone: [phone]
State: [place]
City: [place]
Zip code: [address]
Past Medical History
Allergies
Allergen        Reaction        Date         Comments
Reported
Other allergy                   2021    INFLUENZA, EGG,
FEATHERS
Admission
Admission Data
Admission Date: 2021   Admission Time: 13:03
Arrival Date: 2021     Arrival Time: 13:03
Admit Source: Emergency     Insurance Payor: Medicare
department                  [facility] #: [id number]
Room #: [facility]9
Height (in.): 69.69         BSA: 2.46 (m2)
Height (cm.): 177           BMI: 43.09 (kg/m2)
Weight (lbs.): 297.63
Weight (kg.): 135
Lab Results
Lab Result Date: 2021  Lab Result Time: 0:00
Biochemistry
Name         Units    Result                Min      Max
BUN          mg/dl    10       --(-*--)--   7        18
Creatinine   mg/dl    0.8      --(-*--)--   0.6      1.3
eGFR         ml/min   90       --(*---)--   90       120
NONAFRICAN
Troponin l   ng/ml    0.017    --(-*--)--   0        0.06
CBC
Name         Units    Result                Min      Max
Hematocrit   %        45.3     --(-*--)--   42       54
Hemoglobin   g/dl     15       --(-*--)--   13.5     17.5
 
Procedure
Procedure Types
Cath Procedure
Diagnostic Procedure
[facility] w/Coronaries
Sedation Charges
Moderate Sedation 10-24 minutes
Procedure Description
Procedure Date
Procedure Date: 2021
Procedure Start Time: 11:51
Procedure End Time: 12:02
Procedure Staff
Name                            Function
Charbel Saravia MD              Performing Physician
Denisa Sifuentes RT                Monitor
Margaret Choudhary RT                    Scrub
Ida Flores RN                  Nurse
Justin Knox RN                   Nurse
Procedure Data
Cath Procedure
Fluoroscopy
Diagnostic fluoroscopy      Total fluoroscopy Time: 0.9
time: 0.9 min               min
Diagnostic fluoroscopy      Total fluoroscopy dose: 569
dose: 569 mGy               mGy
Contrast Material
Contrast Material Type                       Amount (ml)
Isovue 370                                   51
Entry Location
Entry     Primary  Successful  Side  Size  Upsize Upsize Entry    Closure Succes
sful  Closure
Location                             (Fr)  1 (Fr) 2 (Fr) Remarks  Device        
      Remarks
Femoral                        Right 5 Fr                         Exoseal
artery
Estimated blood loss: 5 ml
Diagnostic catheters
Device Type               Used For           End Catheter
Placement
MULTIPACK JL 4.0 5Fr      Procedure
catheter
MULTIPACK 3DRC 5Fr        Procedure
catheter
MULTIPACK Pigtail 5 Fr    Procedure
catheter
Procedure Complications
No complications
Procedure Medications
Medication           Administration Route Dosage
0.9% NaCl            I.V.                 100 ml/hr
Oxygen               etCO2 Nasal cannula  2 l/min
Heparin Flush Bag    added to field       2 bags
(1000units/500ml NS)
Lidocaine 2%         added to field       20
Fentanyl             I.V.                 50 mcg
Versed               I.V.                 1 mg
Fentanyl             I.V.                 50 mcg
Versed               I.V.                 1 mg
 
Hemodynamics
Rest
BSA: 2.46 (m2) HGB: 15 (g/dl) O2 Consumption: Estimated: 295.02 (ml/min) O2 Cons
umption
indexed: Estimated:119.93 (ml/min/m) Heart Rate: 74 (bpm)
Pressure Samples
Time     Site     Value (mmHg) Purpose      Heart      Use
Rate(bpm)
11:57    LV       101/-13,16   Snapshot     79
Gradients
Valve  Time  Site Site Mean    SEP/DFP    Peak To Heart  Use
1    2    (mmHg)  (sec/min)  Peak    Rate
(mmHg)  (bpm)
Aortic 11:58 LV   AO                              80
Snapshots
Pre Cath      Intra         NCS           Post Cath
Vital Signs
Time     Heart  Resp   SPO2 etCO2   NIBP (mmHg) Rhythm  Pain    Sedation
Rate   (ipm)  (%)  (mmHg)                      Status  Level
(bpm)
11:29:35 72     11     98   0       146/85(104) NSR     0 (11)  10(A)
, No
pain
11:33:51 74     11     98   16.6    143/83(105) NSR     0 (11)  10(A)
, No
pain
11:38:03 75     12     97   0       133/86(101) NSR     0 (11)  10(A)
, No
pain
11:42:17 70     12     97   0       120/87(100) NSR     0 (11)  9(A)
, No
pain
11:46:29 72     11     96   9.8     124/79(108) NSR     0 (11)  9(A)
, No
pain
11:50:41 70     10     97   37.1    136/79(104) NSR     0 (11)  9(A)
, No
pain
11:54:53 71     12     95   32.5    127/81(97)  NSR     0 (11)  9(A)
, No
pain
11:59:07 77     11     95   13.6    127/84(111) NSR     0 (11)  10(A)
, No
pain
Medications
Time     Medication       Route   Dose  Verified Delivered Reason     Notes  Eff
ectiveness
by       by
11:28:46 0.9% NaCl        I.V.    100   Charbel  Justin      used for
ml/hr St Marcus Knox RN  procedure
MD
11:28:57 Oxygen           etCO2   2     Charbel  Justin      used for
Nasal   l/min [facility] Marcus Knox RN  procedure
cannula       MD
11:29:05 Heparin Flush    added   2     Charbel  Charbel   used for
Bag              to      bags  [facility]   procedure
(1000units/500ml field         MD       MD
NS)
11:29:16 Lidocaine 2%     added   20ml  Charbel  Charbel   for local
to      vial  [facility]   anesthetic
 
field         MD       MD
11:40:25 Fentanyl         I.V.    50    Charbel  Justin      for
mcg   [facility] Marcus Knox RN  sedation
MD
11:40:31 Versed           I.V.    1 mg  Charbel  Justin      for
St Marcus Knox RN  sedation
MD
11:51:11 Fentanyl         I.V.    50    Charbel  Justin      for
mcg   St Marcus Knox RN  sedation
MD
11:51:14 Versed           I.V.    1 mg  Charbel  Justin      for
St Marcus Knox RN  sedation
MD
Procedure Log
Time     Note
11:00:35 Informed consent obtained and on chart
11:01:03 Diagnostic Cath Status : Urgent
11:01:33 Arrival Date: 2021 1:03:00 PM
11:01:58 Insurance Payor : Medicare
11:02:00 Admit Source: Emergency department
11:02:05 Patient Height : 69.69 inches
11:02:08 Patient Weight : 297.63 lbs
11:15:28 Margaret Choudhary RT(R) sent for patient. Start room use.
11:21:21 **ACC** Patient presents with Non-STEMI CCS Anginal
Class 2--Slight limitation of ordinary activity.
11:21:24 Procedure Status Urgent Heart Cath (IP).
11:21:26 Time tracking: Regular hours (M-F 7:00 - 5:00)
11:21:42 Patient received from Med II to CCL 2 Alert and
oriented. Tansferred to table in Supine position.
11:21:43 Warm blankets applied, and sandi hugger turned on for
patient comfort.
11:21:43 Correct patient and procedure confirmed by team.
11:21:44 ECG and BP/O2 sat monitors applied to patient.
11:21:58 H&P Date Dictated: 2021 Within 30 days and on
chart..
11:21:59 Pre-procedure instructions explained to patient.
11:21:59 Pre-op teaching completed and patient verbalized
understanding.
11:22:01 Family unavailable.
11:28:30 Vital chart was started
11:28:46 0.9% NaCl 100 ml/hr I.V. was administered by Justin Knox RN; used for procedure; Verbal order read back
and verified.
11:28:57 Oxygen 2 l/min etCO2 Nasal cannula was administered by
Justin Knox RN; used for procedure; Verbal order read
back and verified.
11:29:05 Heparin Flush Bag (1000units/500ml NS) 2 bags added to
field was administered by Charbel Saravia MD; used for
procedure; Verbal order read back and verified.
11:29:16 Lidocaine 2% 20ml vial added to field was administered
by Charbel Saravia MD; for local anesthetic; Verbal
order read back and verified.
11:31:40 Baseline sample Acquired.
11:31:41 Full Disclosure recording started
11:31:45 Rhythm: sinus rhythm
11:31:47 Patient NPO since Midnight.
11:33:03 Patient allergic to Other allergyINFLUENZA, EGG,
FEATHERS
11:33:08 Is the patient allergic to Iodine/contrast media? No.
11:33:09 Was the patient premedicated? Yes
 
11:33:11 Is patient on blood thinner?Yes
11:33:13 **ACC** The patient was administered the following
blood thiners within the last 24 hours: **ACC**Plavix
11:33:15 Patient diabetic? Yes.
11:33:16 If diabetic: On Metformin? Yes
11:33:27 If on Metformin: Last Dose? 2021
11:33:29 ----Pre-sedation anethsthesia assessment.----
11:33:34 Previous problem with sedation/anesthesia? No ?
11:33:35 Snore? Yes
11:33:37 Sleep apnea? Unknown
11:33:38 Deviated septum? No
11:33:39 Opens mouth fully? Yes
11:33:39 Sticks out tongue? Yes
11:33:44 Airway obstruction? Yes COPD
11:33:46 Dentures? No ?
11:33:49 Pre procedure: right dorsailis pedis pulse 1+
Palpable, but thready & weak; easily obliterated
11:33:54 Patient pain scale 2/10 ?.
11:34:00 IV patent on arrival in left antecubital with 0.9%
NaCl at O.
11:35:10 Lab Result : BUN 10 mg/dl
11:35:10 Lab Result : Troponin l 0.017 ng/ml
11:35:10 Lab Result : Creatinine 0.8 mg/dl
11:35:10 Lab Result : eGFR NONAFRICAN 90 ml/min
11:35:10 Lab Result : Hemoglobin 15 g/dl
11:35:10 Lab Result : Hematocrit 45.3 %
11:35:15 Lab results completed and on chart.
11:35:18 Stress Test: no; N/A ?
11:35:21 Right groin area was prepped with chlora-prep and
draped in sterile fashion
11:35:22 Alarms reviewed by R. N.
11:35:23 Sharps counted by scrub and verified by R.N.
11:35:26 Use device set Femoral Dx
11:35:27 ACIST Syringe (37203) opened to sterile field.
11:35:28 Bag Decanter (2002S) opened to sterile field.
11:35:28 Medline Cath Pack ([id number]) opened to sterile field.
11:35:29 ACIST Hand Control (47626) opened to sterile field.
11:35:30 ACIST Manifold (30416) opened to sterile field.
11:35:30 DIAGNOSTIC Multipack 5Fr catheter set (XJ7489) opened
to sterile field.
11:35:32 SHEATH 5FR [place] (ZAF331) opened to sterile field.
11:35:33 EMERALD Guide Wire (513-269) opened to sterile field.
11:39:49 --------ALL STOP TIME OUT------
11:39:49 Final Timeout: patient, procedure, and site verified
with staff and physician. All members of the team are
in agreement.
11:39:50 Right groin site verified by team.
11:39:53 Fire Safety Assessment: A--An alcohol-based skin
anteseptic being used preoperatively., C--Open oxygen
or nitrous oxide is being used., D--An ESU, laser, or
fiber-optic light is being used.
11:39:57 Physical assessment completed. ASA score P 2 - A
patient with mild systemic disease as per Charbel Saravia MD.
11:40:07 1) 90+ Normal kidney functon but urine findings or
structural abnormalities or genetic trait point to
kidney disease.
11:40:09 Maximum allowable contrast dose (3.7 X eGFR X 0.75)250
ml.
11:40:13 Sedation plan: IV Moderate Sedation Medication:Versed,
 
Fentanyl
11:40:23 IV Extension Set opened to sterile field.
11:40:25 Fentanyl 50 mcg I.V. was administered by Justin Knox
RN; for sedation; Verbal order read back and verified.
11:40:31 Versed 1 mg I.V. was administered by Justin Knox RN;
for sedation; Verbal order read back and verified.
11:50:05 Procedure started.
11:51:11 Fentanyl 50 mcg I.V. was administered by Justin Knox
RN; for sedation; Verbal order read back and verified.
11:51:14 Versed 1 mg I.V. was administered by Justin Knox RN;
for sedation; Verbal order read back and verified.
11:51:46 Local anesthetic to right femoral artery with
Lidocaine 2% by Charbel Saravia MD.**INITIAL ACCESS
ONLY**
11:52:44 A 5 Fr sheath was inserted into the Right Femoral
artery
11:53:19 A MULTIPACK JL 4.0 5Fr catheter was advanced over the
wire and used for Procedure.
11:53:37 LCA angiography performed.
11:53:40 Injector settings: Ml/sec: 3, Volume: 6,
11:54:38 Catheter removed.
11:54:44 A MULTIPACK 3DRC 5Fr catheter was advanced over the
wire and used for Procedure.
11:55:16 RCA angiography performed.
11:55:19 Injector settings: Ml/sec: 3, Volume: 6,
11:55:36 **ACC**Dominant side:Left
11:55:55 Catheter exchanged over wire.
11:57:01 A MULTIPACK Pigtail 5 Fr catheter was advanced over
the wire and used for Procedure.
11:57:15 LV gram done using RUSHING
11:57:38 LV hemodynamics recorded.
11:58:04 EF : 55 %
11:58:08 Catheter removed.
11:58:13 EXOSEAL 5Fr () opened to sterile field.
11:58:14 Tegaderm 4 x 4 (1626W) opened to sterile field.
11:58:33 Sheath removed intact; hemostasis achieved with
Exoseal to the Right Femoral artery.
11:58:49 Fluoroscopy time 00.90 minutes.
11:58:53 Fluoroscopy dose: 569 mGy
11:58:53 Flurop Dose total: 569
11:59:08 Dose Area Product 01921 mGy/cm.
11:59:13 Contrast amount:Isovue 370 51ml.
11:59:15 Procedure ended.(Physican Out)
11:59:22 Maximum allowable dose exceeded? No.
11:59:23 Sharps counted by scrub and verified by R.N.
11:59:28 Post-op/insertion site Right Femoral artery dressed
using a 4 x 4 and Tegaderm.
11:59:33 Post right femoral artery:stable, soft, clean and dry
11:59:41 Post Procedure Pulses reassessed and unchanged
11:59:44 Post procedure: right dorsailis pedis pulse 1+
Palpable, but thready & weak; easily obliterated.
11:59:49 Post-procedure physical assessment completed. ASA
score P 2 - A patient with mild systemic disease as
per Charbel Saravia MD.
11:59:52 Post procedure rhythm: unchanged.
11:59:54 Estimated blood loss: 5 ml
11:59:56 Post procedure instruction explained to
patient.Patient verbalizes understanding.
11:59:56 Patient needs reinforcement of post procedure
teaching.
 
12:00:16 Procedure type changed to Cath procedure, Diagnostic
procedure, LHC, [facility] w/Coronaries, Sedation Charges,
Moderate Sedation 10-24 minutes
12:00:53 Procedure and supply charges have been captured,
reviewed, submitted and are correct.
12:00:57 Procedure Complication : No complications
12:01:55 Vital chart was stopped
12:01:57 [facility] Findings: mild to moderate CAD (<70%)
12:01:58 Operative report dictated upon procedure completion.
12:01:58 See physician's report for complete and final results.
12:02:00 Report given to Med II.
12:02:05 Patient transfered to Med II with Bed.
12:02:12 Procedure ended.
12:02:12 Full Disclosure recording stopped
12:02:18 End room use (Document Last)
12:02:34 End room use (Document Last)
12:03:18 End room use (Document Last)
Device Usage
Item Name   Manufacture  Quantity  Catalog    Hospital Part    Current Minimal L
ot# /
Number     Charge   Number  Stock   Stock   Serial#
Code
ACIST       Acist        1         33475      716000   863479  078308  20
Syringe     Medical
(11371)     Systems Inc
Bag         Microtek     1               526410   61649   889206  5
Decanter    Medical Inc.
()
Medline     Medline      1         [id number]  053145   19580   041569  5
Cath Pack
([id number])
ACIST Hand  Acist        1         48971      398751   370292  146034  5
Control     Medical
(84038)     Systems Inc
ACIST       Acist        1         95477      122686   691650  878368  5
Manifold    Medical
(55007)     Systems Inc
DIAGNOSTIC  Cardinal     1         [id number]     297590   21481   625371  30
Multipack   Health
5Fr
catheter
set
(LT6759)
SHEATH 5FR  Terumo       1         ASE637     221191   414994  982326  5
[place]
(LYE384)
EMERALD     Cardinal     1         502-455    163733   070659  492581  5
Guide Wire  Health
(502-951)
IV          Hospira      1         05954-83   052163   47794   942237  5
Extension
Set
MULTIPACK   Cardinal     1                                     475598  5
JL 4.0 5Fr  Health
catheter
MULTIPACK   Cardinal     1                                     470800  5
3DRC 5Fr    Health
catheter
MULTIPACK   Cardinal     1                                     585025  5
Pigtail 5   Health
 
Fr catheter
EXOSEAL 5Fr Cardinal     1               947330   432105  505073  10
()     Health
Tegaderm 4  3M           1         1626W      875676   978473  408494  5
x 4 (1626W)
Signature Audit [place]
Stage           Time        Signature      Unsigned
Intra-Procedure 2021   Denisa Sifuentes
12:02:34 PM RT(R)
Intra-Procedure 2021   Ida Flores RN
12:03:18 PM
Intra-Procedure 2021   Charbel Rodrigues
12:03:31 PM Marcus MANUEL
Signatures
Performing Physician :  Signature :
Charbel Saravia MD      ______________________________
Date : ______________ Time :
______________
Monitor : Denisa Sifuentes  Signature :
RT                       ______________________________
Date : ______________ Time :
______________
Nurse : dIa Flores RN   Signature :
______________________________
Date : ______________ Time :
______________
Nurse : Justin Knox RN    Signature :
______________________________
Date : ______________ Time :
______________
 
 
 
 
 
 
 
 
 
 
 
 
 
 
 
 
 
 
 
 
 
 
 
 
 
[facility]                                          
[address]                           
[place] [address]

## 2021-05-13 NOTE — CN
PATIENT NAME:ANGUS ROCHA                               MEDICAL RECORD: [id number]
: 65                                              LOCATION:[facility] D.2119
ADMIT DATE: 21                                       ACCOUNT: [id number]
CONSULTING PHYSICIAN:    ALONDRA HAMILTON MD          
                                               
REFERRING PHYSICIAN:     ESTELITA HARRISON MD            
 
 
DATE OF CONSULTATION:  2021
 
HISTORY OF PRESENT ILLNESS:  A 56-year-old gentleman with a known history of
coronary artery disease, status post stenting, actually saw Dr. Eid last week,
has been having recurrent angina, is in the process of being scheduled for
nuclear stress testing; however, had progression of symptoms including rest
symptomatology prompting this admission.  We are asked to see him concerning his
cardiovascular status.
 
PAST MEDICAL HISTORY:  Includes history of;
1. Hypertension.
2. Coronary artery disease described above.
3. Hyperlipidemia.
4. Diabetes mellitus.
 
ALLERGIES:  INFLUENZA VACCINE.
 
MEDICATIONS:  Include Plavix 75 daily, pravastatin 10 daily, fenofibrate 145
daily, lisinopril 2.5 daily, metoprolol 25 b.i.d., aspirin 81 daily, Norco
10/325 daily, Protonix 40 daily, Glucotrol 10 daily, metformin 1 gram b.i.d.,
Farxiga 10 mg p.o. daily, and insulin per scale.
 
SOCIAL HISTORY:  Smokes about a half pack a day by his report, nondrinker,
currently living in [facility].  Does do PT.
 
REVIEW OF SYSTEMS:  The patient reports easy bruising but reports no swollen
glands. The patient reports no fever, no night sweats, no significant weight
gain, no significant weight loss.  No significant exercise tolerance.  The
patient reports no dry eyes, no irritation, no vision change.  Patient reports
no difficulty hearing and no ear pain.  Patient reports no frequent nose bleeds
or nose and sinus problems.  Patient reports on arm pain on exertion.   No
shortness of breath while lying down.  No history of heart murmur.  Patient
reports no cough, no wheezing or coughing up blood.  Patient reports no
abdominal pain, no vomiting.  Normal appetite.  No diarrhea and not vomiting
blood.  No nausea and no constipation.  Patient reports no incontinence.  No
difficulty urinating.  No hematuria.  No increased frequency.  Patient reports
no muscle aches.  No weakness, no arthralgias, no back pain.  No swelling of the
extremities.  Patient reports no abnormal mole, no jaundice, no rashes.  Reports
no loss of consciousness.  No weakness and no numbness.  No seizures, dizziness,
or headaches.  The patient reports no depression, no sleep disturbance, feeling
safe in a relationship and no alcohol abuse.  Patient reports on fatigue. 
Reports no runny nose or sinus pressure.  No itching, no hives, and no frequent
sneezing.  
 
PHYSICAL EXAMINATION:
GENERAL:  No acute distress, appears stated age.
VITAL SIGNS:  127/72, pulse 76 and regular.
HEENT:  Normocephalic, atraumatic.
NECK:  No bruits noted.
HEART:  Regular, II/VI systolic ejection murmur.
 
 
 
CONSULT REPORT                                 [id number]    ANGUS ROCHA             
 
 
LUNGS:  Good air excursion.
ABDOMEN:  Soft and nontender.
EXTREMITIES:  Pulses 2+.  No edema.
NEUROLOGIC:  Grossly intact.
 
IMPRESSION:  Acute coronary syndrome with rest symptomatology, known history of
coronary artery disease.
 
PLAN:  For angiography and intervention based on above.
 
TRANSINT:[id number] Voice Confirmation ID: [id number] DOCUMENT ID: [id number]
                                           
                                           ALONDRA HAMILTON MD          
 
 
 
 
 
 
 
 
 
 
 
 
 
 
 
 
 
 
 
 
 
 
 
 
 
 
 
 
 
 
 
 
 
 
CC:                                                             [id number]
DICTATION DATE: 21 1053     :     21 1130      ADM IN  
                                                                              
[address] [place] [address]

## 2021-05-14 VITALS — SYSTOLIC BLOOD PRESSURE: 101 MMHG | DIASTOLIC BLOOD PRESSURE: 56 MMHG

## 2021-05-14 VITALS — DIASTOLIC BLOOD PRESSURE: 60 MMHG | SYSTOLIC BLOOD PRESSURE: 117 MMHG

## 2021-05-14 VITALS — DIASTOLIC BLOOD PRESSURE: 64 MMHG | SYSTOLIC BLOOD PRESSURE: 106 MMHG

## 2021-05-14 VITALS — DIASTOLIC BLOOD PRESSURE: 66 MMHG | SYSTOLIC BLOOD PRESSURE: 135 MMHG

## 2021-05-14 VITALS — DIASTOLIC BLOOD PRESSURE: 72 MMHG | SYSTOLIC BLOOD PRESSURE: 127 MMHG

## 2021-05-14 LAB
ALT SERPL-CCNC: 29 U/L (ref 10–68)
ANION GAP SERPL CALC-SCNC: 13.7 MMOL/L (ref 8–16)
BASOPHILS NFR BLD AUTO: 0.3 % (ref 0–2)
BUN SERPL-MCNC: 10 MG/DL (ref 7–18)
CALCIUM SERPL-MCNC: 8.9 MG/DL (ref 8.5–10.1)
CHLORIDE SERPL-SCNC: 105 MMOL/L (ref 98–107)
CHOLEST/HDLC SERPL: 3.9 RATIO (ref 2.3–4.9)
CO2 SERPL-SCNC: 26.9 MMOL/L (ref 21–32)
CREAT SERPL-MCNC: 0.8 MG/DL (ref 0.6–1.3)
EOSINOPHIL NFR BLD: 3.2 % (ref 0–7)
ERYTHROCYTE [DISTWIDTH] IN BLOOD BY AUTOMATED COUNT: 14.9 % (ref 11.5–14.5)
GLUCOSE SERPL-MCNC: 136 MG/DL (ref 74–106)
HCT VFR BLD CALC: 45.3 % (ref 42–54)
HDLC SERPL-MCNC: 33 MG/DL (ref 32–96)
HGB BLD-MCNC: 15 G/DL (ref 13.5–17.5)
IMM GRANULOCYTES NFR BLD: 0.3 % (ref 0–5)
LDL-HDL RATIO: 1.4 RATIO (ref 1.5–3.5)
LDLC SERPL-MCNC: 45 MG/DL (ref 0–100)
LYMPHOCYTES # BLD: 1.35 10X3/UL (ref 1.32–3.57)
LYMPHOCYTES NFR BLD AUTO: 19.7 % (ref 15–50)
MAGNESIUM SERPL-MCNC: 1.8 MG/DL (ref 1.8–2.4)
MCH RBC QN AUTO: 27.5 PG (ref 26–34)
MCHC RBC AUTO-ENTMCNC: 33.1 G/DL (ref 31–37)
MCV RBC: 83.1 FL (ref 80–100)
MONOCYTES NFR BLD: 5.3 % (ref 2–11)
NEUTROPHILS # BLD: 4.88 10X3/UL (ref 1.78–5.38)
NEUTROPHILS NFR BLD AUTO: 71.2 % (ref 40–80)
OSMOLALITY SERPL CALC.SUM OF ELEC: 283 MOSM/KG (ref 275–300)
PHOSPHATE SERPL-MCNC: 3.9 MG/DL (ref 2.5–4.9)
PLATELET # BLD: 160 10X3/UL (ref 130–400)
PMV BLD AUTO: 10.4 FL (ref 7.4–10.4)
POTASSIUM SERPL-SCNC: 3.6 MMOL/L (ref 3.5–5.1)
RBC # BLD AUTO: 5.45 10X6/UL (ref 4.2–6.1)
SODIUM SERPL-SCNC: 142 MMOL/L (ref 136–145)
TRIGL SERPL-MCNC: 256 MG/DL (ref 30–200)
WBC # BLD AUTO: 6.9 10X3/UL (ref 4.8–10.8)

## 2021-05-14 NOTE — NUR
INITIAL ROUNDS AND ASSESSMENT COMPLETED. PT RESTING IN BED. ALERT/ORIENTED.
ASSESSED RIGHT GROIN. DRESSING C/D/I. SR PER TELEMETRY. NONLABORED
RESPIRATIONS ON ROOM AIR. CALL LIGHT IN REACH. SR UP X 2.

## 2021-05-14 NOTE — NUR
ALL BEDTIME MEDS GIVEN. FSBS 201, PT ONLY WANTED 2 UNITS OF SLIDINGS SCALE,
THE WAY HE TAKES IT AT HOME. LANTUS 30 UNITS ALSO GIVEN.  BACK IN ROOM LATER
TO ASSIST PT TO GET BACK IN BED, STRAIGHTEN HIS COVERS AND PLACE HIS CALL
LIGHT IN REACH.

## 2021-05-14 NOTE — NUR
ALL BEDTIME MEDS GIVEN. FSBS 201, PT ONLY WANTED 2 UNITS OF SLIDINGS SCALE,
THE WAY HE TAKES IT AT HOME. LANTUS 30 UNITS ALSO GIVEN. NO OTHER NEEDS. CALL
LIGHT IN REACH.

## 2021-05-15 VITALS — SYSTOLIC BLOOD PRESSURE: 132 MMHG | DIASTOLIC BLOOD PRESSURE: 85 MMHG

## 2021-05-15 VITALS — SYSTOLIC BLOOD PRESSURE: 132 MMHG | DIASTOLIC BLOOD PRESSURE: 90 MMHG

## 2021-05-15 NOTE — NUR
PROVIDED VERBAL AND WRITTEN DISCHARGE INSTRUCTIONS, PT VERBALIZED
UNDERSTANDING. REMOVED IV WITH IV CATHETER TIP INTACT. REMOVED HEART MONITOR
AND TAKEN BACK MONITOR TECH.

## 2021-05-15 NOTE — NUR
SPOKE WITH FACILITY  REGARDING PT DISCHARGE, NEEDING TO KNOW IF TO STAY
AVAILABLE.  INFORMED ORDERS WERE IN FOR DC SO NURSE TO CALL WHEN PT READY.

## 2021-05-15 NOTE — NUR
FSBS 232, PT ONLY TAKING 4 UNITS OF SLIDING SCALE BASED ON HIS HOME SCALE.
ALERT/ORIENTED. RIGHT GROIN DRESSING C/D/I. CALL LIGHT IN REACH.

## 2021-05-24 ENCOUNTER — HOSPITAL ENCOUNTER (OUTPATIENT)
Dept: HOSPITAL 84 - D.HCCECHO | Age: 56
Discharge: HOME | End: 2021-05-24
Attending: INTERNAL MEDICINE
Payer: MEDICARE

## 2021-05-24 VITALS — BODY MASS INDEX: 42.9 KG/M2

## 2021-05-24 DIAGNOSIS — I25.10: Primary | ICD-10-CM
